# Patient Record
Sex: FEMALE | Race: WHITE | NOT HISPANIC OR LATINO | Employment: FULL TIME | URBAN - METROPOLITAN AREA
[De-identification: names, ages, dates, MRNs, and addresses within clinical notes are randomized per-mention and may not be internally consistent; named-entity substitution may affect disease eponyms.]

---

## 2023-07-15 ENCOUNTER — HOSPITAL ENCOUNTER (EMERGENCY)
Facility: HOSPITAL | Age: 53
Discharge: HOME | End: 2023-07-15
Attending: STUDENT IN AN ORGANIZED HEALTH CARE EDUCATION/TRAINING PROGRAM
Payer: COMMERCIAL

## 2023-07-15 VITALS
HEIGHT: 68 IN | OXYGEN SATURATION: 97 % | BODY MASS INDEX: 21.22 KG/M2 | RESPIRATION RATE: 16 BRPM | SYSTOLIC BLOOD PRESSURE: 108 MMHG | DIASTOLIC BLOOD PRESSURE: 66 MMHG | WEIGHT: 140 LBS | HEART RATE: 73 BPM | TEMPERATURE: 98 F

## 2023-07-15 DIAGNOSIS — S92.911A UNSPECIFIED FRACTURE OF RIGHT TOE(S), INITIAL ENCOUNTER FOR CLOSED FRACTURE: ICD-10-CM

## 2023-07-15 DIAGNOSIS — Z87.81 HISTORY OF RIB FRACTURE: Primary | ICD-10-CM

## 2023-07-15 DIAGNOSIS — R07.89 CHEST WALL PAIN: ICD-10-CM

## 2023-07-15 PROCEDURE — 99284 EMERGENCY DEPT VISIT MOD MDM: CPT | Mod: 25

## 2023-07-15 PROCEDURE — 25000003 PHARM REV CODE 250: Performed by: STUDENT IN AN ORGANIZED HEALTH CARE EDUCATION/TRAINING PROGRAM

## 2023-07-15 RX ORDER — ARIPIPRAZOLE 20 MG/1
20 TABLET ORAL
COMMUNITY
Start: 2023-07-14

## 2023-07-15 RX ORDER — OXYCODONE AND ACETAMINOPHEN 7.5; 325 MG/1; MG/1
1 TABLET ORAL EVERY 6 HOURS PRN
Qty: 8 TABLET | Refills: 0 | Status: SHIPPED | OUTPATIENT
Start: 2023-07-15

## 2023-07-15 RX ORDER — RIZATRIPTAN BENZOATE 10 MG/1
10 TABLET ORAL
COMMUNITY
Start: 2023-06-08

## 2023-07-15 RX ORDER — BUPROPION HYDROCHLORIDE 150 MG/1
150 TABLET ORAL EVERY MORNING
COMMUNITY
Start: 2023-05-13

## 2023-07-15 RX ORDER — OXYCODONE AND ACETAMINOPHEN 10; 325 MG/1; MG/1
1 TABLET ORAL
Status: COMPLETED | OUTPATIENT
Start: 2023-07-15 | End: 2023-07-15

## 2023-07-15 RX ORDER — VARENICLINE 0.03 MG/.05ML
SPRAY NASAL
COMMUNITY
Start: 2023-03-01

## 2023-07-15 RX ORDER — HYDROCODONE BITARTRATE AND ACETAMINOPHEN 5; 325 MG/1; MG/1
TABLET ORAL
COMMUNITY
Start: 2023-07-15

## 2023-07-15 RX ORDER — CHLORZOXAZONE 500 MG/1
500 TABLET ORAL 3 TIMES DAILY
COMMUNITY
Start: 2023-07-14

## 2023-07-15 RX ORDER — CLONIDINE HYDROCHLORIDE 0.1 MG/1
0.1 TABLET ORAL NIGHTLY
COMMUNITY
Start: 2023-06-08

## 2023-07-15 RX ORDER — LINACLOTIDE 72 UG/1
72 CAPSULE, GELATIN COATED ORAL EVERY MORNING
COMMUNITY
Start: 2023-06-26

## 2023-07-15 RX ADMIN — OXYCODONE AND ACETAMINOPHEN 1 TABLET: 10; 325 TABLET ORAL at 09:07

## 2023-07-16 NOTE — ED PROVIDER NOTES
SCRIBE #1 NOTE: I, Selwyn Watkins, am scribing for, and in the presence of, Farzad Prado MD. I have scribed the entire note.       History     Chief Complaint   Patient presents with    Fall     Fall out of attic yesterday, went to  today and they sent pt her for further eval of rib injury     Review of patient's allergies indicates:   Allergen Reactions    Asa [aspirin]      nosebleeds    Erythromycin          History of Present Illness     HPI    7/15/2023, 8:50 PM  History obtained from the patient      History of Present Illness: Theresa Nicole Abadie is a 52 y.o. female patient who presents to the Emergency Department for evaluation of fall which onset gradually yesterday. Pt states she fell from her attic when she mis stepped. She had visited urgent care yesterday before coming to ER and had x-ray of R foot and had her R big toe broken in 2 different paces, they also advised her to come for possible fractures of front ribs 4-8. Since being in ER she has had a stabbing pain in her chest wall that radiates to er back. Symptoms are constant and moderate in severity. Pain is worsened with deeper breaths. Associated sxs include CP. Patient denies any abd pain, HA, dizziness, fever, and all other sxs at this time. Prior Tx includes hydrocodone which provided no relief, as well as naproxen. No further complaints or concerns at this time.       Arrival mode: Personal vehicle     PCP: Primary Doctor No        Past Medical History:  Past Medical History:   Diagnosis Date    Anxiety     Depression     Insomnia     Migraine headache        Past Surgical History:  Past Surgical History:   Procedure Laterality Date    CERVIX SURGERY      DILATION AND CURETTAGE OF UTERUS           Family History:  History reviewed. No pertinent family history.    Social History:  Social History     Tobacco Use    Smoking status: Some Days     Packs/day: 0.30     Types: Cigarettes, Vaping with nicotine    Smokeless tobacco: Not on file    Substance and Sexual Activity    Alcohol use: No    Drug use: No    Sexual activity: Not on file        Review of Systems     Review of Systems   Constitutional:  Negative for fever.   HENT:  Negative for sore throat.    Respiratory:  Negative for shortness of breath.    Cardiovascular:  Positive for chest pain.   Gastrointestinal:  Negative for abdominal pain and nausea.   Genitourinary:  Negative for dysuria.   Musculoskeletal:  Positive for back pain. Negative for neck pain.   Skin:  Negative for rash.   Neurological:  Negative for dizziness, weakness, light-headedness and headaches.   Hematological:  Does not bruise/bleed easily.   All other systems reviewed and are negative.     Physical Exam     Initial Vitals [07/15/23 1939]   BP Pulse Resp Temp SpO2   101/60 100 20 97.7 °F (36.5 °C) 96 %      MAP       --          Physical Exam  Constitutional:       General: She is not in acute distress.     Appearance: Normal appearance. She is well-developed. She is not ill-appearing.   HENT:      Head: Normocephalic and atraumatic.      Nose: No congestion or rhinorrhea.   Eyes:      Conjunctiva/sclera: Conjunctivae normal.      Pupils: Pupils are equal, round, and reactive to light.   Cardiovascular:      Rate and Rhythm: Normal rate and regular rhythm.      Heart sounds: Normal heart sounds. No murmur heard.    No friction rub. No gallop.   Pulmonary:      Effort: No respiratory distress.      Breath sounds: Normal breath sounds. No stridor. No wheezing, rhonchi or rales.   Chest:      Chest wall: Tenderness (Bilateral) present.       Abdominal:      General: Bowel sounds are normal. There is no distension.      Palpations: Abdomen is soft.      Tenderness: There is no abdominal tenderness. There is no guarding or rebound.   Musculoskeletal:         General: No tenderness, deformity or signs of injury. Normal range of motion.      Cervical back: Normal range of motion and neck supple. No tenderness.   Skin:      "General: Skin is warm and dry.      Coloration: Skin is not jaundiced.      Findings: No rash.   Neurological:      General: No focal deficit present.      Mental Status: She is alert and oriented to person, place, and time.      Cranial Nerves: No cranial nerve deficit.      Sensory: No sensory deficit.      Motor: No weakness.     Nursing Notes and Vital Signs Reviewed.       ED Course   Procedures  ED Vital Signs:  Vitals:    07/15/23 1939 07/15/23 2100 07/15/23 2102 07/15/23 2106   BP: 101/60 108/66     Pulse: 100  73    Resp: 20   16   Temp: 97.7 °F (36.5 °C)      TempSrc: Oral      SpO2: 96%  97%    Weight: 63.5 kg (140 lb)      Height: 5' 8" (1.727 m)          Abnormal Lab Results:  Labs Reviewed - No data to display     All Lab Results:  No results found for this or any previous visit.     Imaging Results:  Imaging Results              CT Chest Abdomen Pelvis Without Contrast (XPD) (Final result)  Result time 07/15/23 20:48:14      Final result by Destiny Cristina MD (07/15/23 20:48:14)                   Impression:      No overt traumatic finding as visualized limited unenhanced exam      Electronically signed by: Destiny Cristina  Date:    07/15/2023  Time:    20:48               Narrative:    EXAMINATION:  CT CHEST ABDOMEN PELVIS WITHOUT CONTRAST(XPD)    CLINICAL HISTORY:  Polytrauma, blunt;    TECHNIQUE:  Low dose axial images, sagittal and coronal reformations were obtained from the thoracic inlet to the pubic symphysis    COMPARISON:  None    FINDINGS:  No overt traumatic mediastinal finding limited without contrast.    No pleural effusion or sizable pneumothorax.  Bibasilar linear pulmonary opacity    Unenhanced liver and spleen unremarkable    Pancreas and kidneys without acute finding    No atypical bowel finding.  Appendix normal caliber.    Scant low-density pelvic ascites    Urinary bladder unremarkable partially distended    Sternal fracture deformity appears nonacute                        "                           The Emergency Provider reviewed the vital signs and test results, which are outlined above.     ED Discussion     9:25 PM: Reassessed pt at this time. Discussed with pt all pertinent ED information and results. Discussed pt dx and plan of tx. Gave pt all f/u and return to the ED instructions. All questions and concerns were addressed at this time. Pt expresses understanding of information and instructions, and is comfortable with plan to discharge. Pt is stable for discharge.    I discussed with patient and/or family/caretaker that evaluation in the ED does not suggest any emergent or life threatening medical conditions requiring immediate intervention beyond what was provided in the ED, and I believe patient is safe for discharge.  Regardless, an unremarkable evaluation in the ED does not preclude the development or presence of a serious of life threatening condition. As such, patient was instructed to return immediately for any worsening or change in current symptoms.     ED Course as of 07/15/23 2125   Sat Jul 15, 2023   2115 Discussed imaging in detail with Dr. Linn, She confirms that rib deformities are nonacute and show signs of prior healing. No sequelae of trauma noted either. [KB]      ED Course User Index  [KB] Farzad Prado MD     Medical Decision Making:   Clinical Tests:   Radiological Study: Ordered and Reviewed  ED Management:  This patient presents with rib pain after a fall from an attic. Old, healed fractures seen, corresponding with history of remote trauma per patient. No acute fractures seen.    Differential includes Muscular strain, contusion, fracture, dislocation, ligamental, or tendon injuries.    Discussed limitations of XR/CT imaging in regards to soft tissue/ligamental/tendon injuries. Patient is to f/u with PCP for reevaluation and determination of need for advanced imaging. Trained on incentive spirometry by resp therapy.    Pain improved with treatment  in the ED    Advised RICE therapy    Told to return to ED if symptoms worsen, return, or change in character.    Other:   I have discussed this case with another health care provider.         ED Medication(s):  Medications   oxyCODONE-acetaminophen  mg per tablet 1 tablet (1 tablet Oral Given 7/15/23 2106)       New Prescriptions    OXYCODONE-ACETAMINOPHEN (PERCOCET) 7.5-325 MG PER TABLET    Take 1 tablet by mouth every 6 (six) hours as needed for Pain.        Follow-up Information       Primary care provider of your choice. Schedule an appointment as soon as possible for a visit in 5 days.    Why: For follow-up on today's visit.             Go to  [facility] - Emergency Dept..    Specialty: Emergency Medicine  Why: As needed, If symptoms worsen  Contact information:  [address] [facility] [address] [phone]                               Scribe Attestation:   Scribe #1: I performed the above scribed service and the documentation accurately describes the services I performed. I attest to the accuracy of the note.     Attending:   Physician Attestation Statement for Scribe #1: I, Farzad Prado MD, personally performed the services described in this documentation, as scribed by Selwyn Watkins, in my presence, and it is both accurate and complete.           Clinical Impression       ICD-10-CM ICD-9-CM   1. History of rib fracture  Z87.81 V15.51   2. Chest wall pain  R07.89 786.52       Disposition:   Disposition: Discharged  Condition: Stable      Farzad Prado MD  07/19/23 9277

## 2023-07-16 NOTE — ED NOTES
Pt provided with incentive spirometer and instructions for use.  Pt demonstrated proper use, and patient and visitor voiced understanding.

## 2023-07-16 NOTE — FIRST PROVIDER EVALUATION
"Medical screening examination initiated.  I have conducted a focused provider triage encounter, findings are as follows:    Brief history of present illness:  Patient was sent to the ER by urgent care with multiple rib fractures.  Patient reports falling out of at yesterday.    Vitals:    07/15/23 1939   BP: 101/60   BP Location: Right arm   Patient Position: Sitting   Pulse: 100   Resp: 20   Temp: 97.7 °F (36.5 °C)   TempSrc: Oral   SpO2: 96%   Weight: 63.5 kg (140 lb)   Height: 5' 8" (1.727 m)       Pertinent physical exam:      Brief workup plan:      Preliminary workup initiated; this workup will be continued and followed by the physician or advanced practice provider that is assigned to the patient when roomed.  "

## 2023-07-16 NOTE — ED NOTES
Pt resting in ER stretcher, aaox4, rr e/u, NAD noted. Vss noted. Bed low and locked, call light in reach, side rails up x2. Family at the bedside.  Pt verbalized understanding of status and POC; denies further needs. Will continue to monitor.

## 2023-07-17 ENCOUNTER — TELEPHONE (OUTPATIENT)
Dept: ORTHOPEDICS | Facility: CLINIC | Age: 53
End: 2023-07-17
Payer: COMMERCIAL

## 2023-07-17 NOTE — TELEPHONE ENCOUNTER
Patient was scheduled incorrectly with Luis Manuel Lebron PA-C I attempted to call the patient several times to get her rescheduled with the correct provider the patient did not answer an I could not leave a voicemail

## 2023-07-17 NOTE — TELEPHONE ENCOUNTER
----- Message from Ysabel Diop sent at 7/17/2023 10:24 AM CDT -----  Contact: Gayle  .Type:  Patient Returning Call    Who Called:Gayle   Who Left Message for Patient:nurse   Does the patient know what this is regarding?:appt today   Would the patient rather a call back or a response via [facility]? Call   Best Call Back Number:.[phone]  Additional Information: Pt retunring a call

## 2025-06-29 ENCOUNTER — HOSPITAL ENCOUNTER (EMERGENCY)
Facility: HOSPITAL | Age: 55
Discharge: HOME | End: 2025-06-29
Attending: EMERGENCY MEDICINE
Payer: COMMERCIAL

## 2025-06-29 VITALS
RESPIRATION RATE: 18 BRPM | SYSTOLIC BLOOD PRESSURE: 116 MMHG | HEIGHT: 68 IN | OXYGEN SATURATION: 97 % | HEART RATE: 69 BPM | DIASTOLIC BLOOD PRESSURE: 74 MMHG | BODY MASS INDEX: 21.85 KG/M2 | WEIGHT: 144.13 LBS | TEMPERATURE: 98 F
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
## 2025-06-29 DIAGNOSIS — S32.502A CLOSED NONDISPLACED FRACTURE OF LEFT PUBIS, INITIAL ENCOUNTER: Primary | ICD-10-CM

## 2025-06-29 DIAGNOSIS — S79.912A HIP INJURY, LEFT, INITIAL ENCOUNTER: ICD-10-CM

## 2025-06-29 PROCEDURE — 99284 EMERGENCY DEPT VISIT MOD MDM: CPT | Mod: 25

## 2025-06-29 PROCEDURE — 25000003 PHARM REV CODE 250: Performed by: EMERGENCY MEDICINE

## 2025-06-29 PROCEDURE — 96372 THER/PROPH/DIAG INJ SC/IM: CPT | Performed by: EMERGENCY MEDICINE

## 2025-06-29 PROCEDURE — 63600175 PHARM REV CODE 636 W HCPCS: Performed by: EMERGENCY MEDICINE

## 2025-06-29 RX ORDER — MORPHINE SULFATE 4 MG/ML
6 INJECTION, SOLUTION INTRAMUSCULAR; INTRAVENOUS
Refills: 0 | Status: COMPLETED | OUTPATIENT
Start: 2025-06-29 | End: 2025-06-29

## 2025-06-29 RX ORDER — METHOCARBAMOL 500 MG/1
500 TABLET, FILM COATED ORAL
Status: COMPLETED | OUTPATIENT
Start: 2025-06-29 | End: 2025-06-29

## 2025-06-29 RX ORDER — HYDROCODONE BITARTRATE AND ACETAMINOPHEN 10; 325 MG/1; MG/1
1 TABLET ORAL
Refills: 0 | Status: COMPLETED | OUTPATIENT
Start: 2025-06-29 | End: 2025-06-29

## 2025-06-29 RX ORDER — METHOCARBAMOL 500 MG/1
1000 TABLET, FILM COATED ORAL 3 TIMES DAILY
Qty: 30 TABLET | Refills: 0 | Status: SHIPPED | OUTPATIENT
Start: 2025-06-29 | End: 2025-07-04

## 2025-06-29 RX ORDER — METHIMAZOLE 10 MG/1
5 TABLET ORAL 3 TIMES DAILY
COMMUNITY

## 2025-06-29 RX ORDER — HYDROCODONE BITARTRATE AND ACETAMINOPHEN 10; 325 MG/1; MG/1
1 TABLET ORAL EVERY 6 HOURS PRN
Qty: 12 TABLET | Refills: 0 | Status: SHIPPED | OUTPATIENT
Start: 2025-06-29

## 2025-06-29 RX ADMIN — HYDROCODONE BITARTRATE AND ACETAMINOPHEN 1 TABLET: 10; 325 TABLET ORAL at 03:06

## 2025-06-29 RX ADMIN — MORPHINE SULFATE 6 MG: 4 INJECTION INTRAVENOUS at 05:06

## 2025-06-29 RX ADMIN — METHOCARBAMOL 500 MG: 500 TABLET ORAL at 05:06

## 2025-06-29 NOTE — Clinical Note
"Gayle Suggs" Abadie was seen and treated in our emergency department on 6/29/2025.  She may return to work on 07/02/2025.       If you have any questions or concerns, please don't hesitate to call.      Jill Álvarez MD"

## 2025-06-29 NOTE — ED PROVIDER NOTES
SCRIBE #1 NOTE: I, Prabha Malone, am scribing for, and in the presence of, Jill Álvarez MD. I have scribed the entire note.       History     Chief Complaint   Patient presents with    Fall     Patient presents to ED s/p slip and fall last night. The patient states she is unable to bear weight to Left hip/groin.      Review of patient's allergies indicates:   Allergen Reactions    Asa [aspirin]      nosebleeds    Azithromycin     Codeine Hives    Erythromycin     Propoxyphene n-acetaminophen Hives         History of Present Illness     HPI    6/29/2025, 3:26 PM  History obtained from the patient and medical records      History of Present Illness: Theresa Nicole Abadie is a 54 y.o. female patient with a PMHx of anxiety, depression, insomnia, and migraine headaches who presents to the Emergency Department for evaluation after tripping and falling on her hardwood floor last night. Pt landed on her left hip and has been unable to bear weight on her LLE. She has been ambulating with a crutch. Pt is now c/o left-sided groin pain and mild lower back pain. Symptoms are constant and moderate in severity. Patient denies any neck pain, no loss of bowel or bladder function, no urinary retention, no dizziness, or HA. No prior Tx specified.  No further complaints or concerns at this time.       Arrival mode: Personal Transportation    PCP: Mamta, Primary Doctor        Past Medical History:  Past Medical History:   Diagnosis Date    Anxiety     Depression     Insomnia     Migraine headache        Past Surgical History:  Past Surgical History:   Procedure Laterality Date    CERVIX SURGERY      DILATION AND CURETTAGE OF UTERUS           Family History:  No family history on file.    Social History:  Social History     Tobacco Use    Smoking status: Some Days     Current packs/day: 0.30     Types: Cigarettes, Vaping with nicotine    Smokeless tobacco: Not on file   Substance and Sexual Activity    Alcohol use: No    Drug use:  No    Sexual activity: Not on file        Review of Systems     Review of Systems   Constitutional:  Negative for fever.   HENT:  Negative for sore throat.    Respiratory:  Negative for shortness of breath.    Cardiovascular:  Negative for chest pain.   Gastrointestinal:  Negative for nausea.   Genitourinary:  Negative for dysuria.   Musculoskeletal:  Positive for arthralgias (left pelvis), back pain (lower back) and gait problem. Negative for neck pain.   Skin:  Negative for rash.   Neurological:  Negative for dizziness, weakness and headaches.   Hematological:  Does not bruise/bleed easily.   All other systems reviewed and are negative.     Physical Exam     Initial Vitals [06/29/25 1352]   BP Pulse Resp Temp SpO2   110/72 88 16 97.9 °F (36.6 °C) 97 %      MAP       --          Physical Exam  Nursing Notes and Vital Signs Reviewed.  Constitutional: Patient is in no acute distress. Well-developed and well-nourished.  Head: Atraumatic. Normocephalic.  Eyes: PERRL. EOM intact. Conjunctivae are not pale. No scleral icterus.  ENT: Mucous membranes are moist. Oropharynx is clear and symmetric.    Neck: Supple. Full ROM. No lymphadenopathy.  Cardiovascular: Regular rate. Regular rhythm. No murmurs, rubs, or gallops. Distal pulses are 2+ and symmetric.  Pulmonary/Chest: No respiratory distress. Clear to auscultation bilaterally. No wheezing or rales.  Abdominal: Soft and non-distended. There is no tenderness.  No rebound, guarding, or rigidity. Good bowel sounds.  Genitourinary: No CVA tenderness.  Musculoskeletal: reproducible tenderness left groin, no obvious swelling, no bony deformity. No obvious leg shortening. Pelvis stable to compression. No reproducible Lumbar spinal tenderness. No deformities or step-offs. No edema. No calf tenderness.  Skin: Warm and dry.  Neurological:  Alert, awake, and appropriate.  Normal speech.  No acute focal neurological deficits are appreciated.  Psychiatric: Normal affect. Good eye  "contact. Appropriate in content.     ED Course   Procedures  ED Vital Signs:  Vitals:    06/29/25 1352 06/29/25 1500 06/29/25 1530 06/29/25 1628   BP: 110/72 117/80 116/74    Pulse: 88 72 69    Resp: 16 18 18    Temp: 97.9 °F (36.6 °C) 97.9 °F (36.6 °C) 97.9 °F (36.6 °C)    TempSrc: Oral      SpO2: 97% 96% 97%    Weight:    65.4 kg (144 lb 1.6 oz)   Height: 5' 8" (1.727 m)       06/29/25 1748   BP:    Pulse:    Resp: 18   Temp:    TempSrc:    SpO2:    Weight:    Height:        Abnormal Lab Results:  Labs Reviewed - No data to display     All Lab Results:  None.    Imaging Results:  Imaging Results              X-Ray Lumbar Spine Ap And Lateral (Final result)  Result time 06/29/25 15:37:03      Final result by Daniel Chakraborty MD (06/29/25 15:37:03)                   Impression:     L5-S1 spondylosis without acute radiographic abnormality.    Finalized on: 6/29/2025 3:37 PM By:  Daniel Chakraborty MD  [facility]# [id number]      2025-06-29 15:39:06.152     [facility]               Narrative:    EXAM: XR LUMBAR SPINE AP AND LATERAL    CLINICAL HISTORY: Low back pain    COMPARISON: None    FINDINGS:    5, nonrib-bearing lumbar-type vertebral bodies.  Lumbar vertebral body heights are maintained.  Lumbar alignment is within normal limits.  L5-S1 disc height loss.  Remaining intervertebral disc heights appear 5 facet arthropathy.  No evidence of an acute displaced fracture.                                         X-Ray Hip 2 or 3 views Left with Pelvis when performed (Final result)  Result time 06/29/25 15:34:10      Final result by Ludwin Wilkins DO (06/29/25 15:34:10)                   Narrative:    Exam: XR HIP WITH PELVIS WHEN PERFORMED 2 OR 3 VIEWS LEFT    Comparison: None    Clinical Indication:  Injury    Findings: Nondisplaced fractures at the superior and inferior pubic ramus on the left.  No additional fractures.  Visualized portions of the abdomen and pelvis are unremarkable.    Finalized on: 6/29/2025 3:34 PM By:  " Ludwin Wilkins  [facility]# [id number]      2025-06-29 15:36:16.022     [facility]                                         The Emergency Provider reviewed the vital signs and test results, which are outlined above.     ED Discussion     4:45 PM: Discussed pt's case with Orlando Guzman MD (Orthopedic Surgery) who agrees with the plan to order pt a walker and have her follow up as an outpatient in orthopedic trauma clinic.     Gayle's mobility limitation cannot be sufficiently resolved by the use of a cane. Her functional mobility deficit can be sufficiently resolved with the use of a Rolling Walker. Patient's mobility limitation significantly impairs their ability to participate in one of more activities of daily living.  The use of a RW will significantly improve the patient's ability to participate in MRADLS and the patient will use it on regular basis in the home.    4:46 PM: Reassessed pt at this time. Discussed with patient and/or family/caretaker all pertinent ED information and results. Discussed pt dx and plan of tx. Gave the patient all f/u and return to the ED instructions. All questions and concerns were addressed at this time. Patient and/or family/caretaker expresses understanding of information and instructions, and is comfortable with plan to discharge. Pt is stable for discharge.     I discussed with patient and/or family/caretaker that evaluation in the ED does not suggest any emergent or life threatening medical conditions requiring immediate intervention beyond what was provided in the ED, and I believe patient is safe for discharge. Regardless, an unremarkable evaluation in the ED does not preclude the development or presence of a serious or life threatening condition. As such, I instructed that the patient is to return immediately for any worsening or change in current symptoms.           Medical Decision Making  DDX: 1. Pelvic fx 2. Lumbar fx 3. Hip fx    Xray of lumbar spine negative for trauma, xray of  left hip negative for fx, xray does show nondisplaced pelvic fx, pain controlled, social work consulted for arranging for patient to have a walker, already has crutches, ortho consulted, discharge home with outpatient follow up, reasons to return given.     Amount and/or Complexity of Data Reviewed  Radiology: ordered. Decision-making details documented in ED Course.  Discussion of management or test interpretation with external provider(s): Ortho consulted agrees with discharge and outpatient follow up, walker to assist with ambulation as tolerated    Risk  Prescription drug management.                ED Medication(s):  Medications   HYDROcodone-acetaminophen  mg per tablet 1 tablet (1 tablet Oral Given 6/29/25 1500)   morphine injection 6 mg (6 mg Intramuscular Given 6/29/25 1748)   methocarbamoL tablet 500 mg (500 mg Oral Given 6/29/25 1748)       Discharge Medication List as of 6/29/2025  4:47 PM        START taking these medications    Details   HYDROcodone-acetaminophen (NORCO)  mg per tablet Take 1 tablet by mouth every 6 (six) hours as needed for Pain., Starting Sun 6/29/2025, Normal      methocarbamoL (ROBAXIN) 500 MG Tab Take 2 tablets (1,000 mg total) by mouth 3 (three) times daily. for 5 days, Starting Sun 6/29/2025, Until Fri 7/4/2025, Normal              Follow-up Information       Clinic, [facility]. Schedule an appointment as soon as possible for a visit in 3 days.    Why: Return to the Emergency Room, If symptoms worsen  Contact information:  [address] [place] [address] [address]  [facility] [address]  [phone]                                 Scribe Attestation:   Scribe #1: I performed the above scribed service and the documentation accurately describes the services I performed. I attest to the accuracy of the note.     Attending:   Physician Attestation Statement for Scribe #1: I, Jill Álvarez MD, personally performed the services described in this documentation, as  scribed by Prabha Malone, in my presence, and it is both accurate and complete.           Clinical Impression       ICD-10-CM ICD-9-CM   1. Closed nondisplaced fracture of left pubis, initial encounter  S32.502A 808.2   2. Hip injury, left, initial encounter  S79.912A 959.6   3. Fall at home, initial encounter  W19.XXXA E888.9    Y92.009 E849.0       Disposition:   Disposition: Discharged  Condition: Stable         Jill Álvarez MD  06/30/25 2068

## 2025-06-30 ENCOUNTER — TELEPHONE (OUTPATIENT)
Facility: OTHER | Age: 55
End: 2025-06-30
Payer: COMMERCIAL

## 2025-06-30 NOTE — PROGRESS NOTES
Patient seen in the ED on 6/29/2025 for fall.  Patient's call escalated to post ED text tracker.  Spoke with patient and she inquired about DME to be delivered.  Message routed to Sudha Desir RN, for patient call back for further assistance.  Patient advised and appreciative.     No

## 2025-07-01 ENCOUNTER — TELEPHONE (OUTPATIENT)
Dept: ORTHOPEDICS | Facility: CLINIC | Age: 55
End: 2025-07-01
Payer: COMMERCIAL

## 2025-07-01 NOTE — TELEPHONE ENCOUNTER
----- Message from Nurse Robles sent at 7/1/2025 11:02 AM CDT -----  Does this patient need to see ирина?  ----- Message -----  From: Arleen Banerjee  Sent: 7/1/2025  11:01 AM CDT  To: Thomas Carroll    .Type:  Same Day Appointment Request    Caller is requesting a same day appointment.  Caller declined first available appointment listed below.    Name of Caller:.Theresa Nicole Abadie   When is the first available appointment?10/27/2025  Symptoms:Closed nondisplaced fracture of left pubis, initial encounter [S32.502A]  Best Call Back Number:.[phone]   Additional Information: The decision prompted me to send message to clinic if there isn't an appointment within 3 days. Patient would like an appointment today or tomorrow please.  Thx. EL

## 2025-07-01 NOTE — TELEPHONE ENCOUNTER
Returned call to patient to schedule her ER follow up appointment. Patient states that she will call her PCP to get a referral to an outside orthopedic due to not knowing if her insurance is accepted at [facility]. Understanding verbalized.